# Patient Record
Sex: MALE | Race: WHITE | Employment: FULL TIME | URBAN - METROPOLITAN AREA
[De-identification: names, ages, dates, MRNs, and addresses within clinical notes are randomized per-mention and may not be internally consistent; named-entity substitution may affect disease eponyms.]

---

## 2019-08-13 ENCOUNTER — THERAPY VISIT (OUTPATIENT)
Dept: PHYSICAL THERAPY | Facility: CLINIC | Age: 29
End: 2019-08-13
Payer: COMMERCIAL

## 2019-08-13 DIAGNOSIS — M54.42 LEFT-SIDED LOW BACK PAIN WITH LEFT-SIDED SCIATICA: ICD-10-CM

## 2019-08-13 PROCEDURE — 97110 THERAPEUTIC EXERCISES: CPT | Mod: GP | Performed by: PHYSICAL THERAPIST

## 2019-08-13 PROCEDURE — 97112 NEUROMUSCULAR REEDUCATION: CPT | Mod: GP | Performed by: PHYSICAL THERAPIST

## 2019-08-13 PROCEDURE — 97161 PT EVAL LOW COMPLEX 20 MIN: CPT | Mod: GP | Performed by: PHYSICAL THERAPIST

## 2019-08-13 NOTE — PROGRESS NOTES
[facility] Initial Evaluation  Subjective:  The history is provided by the patient. No  was used.   Chu Elizabeth being seen for LBP.   Problem began 7/29/2019. Where condition occurred: for unknown reasons.Problem occurred: insidious  and reported as 6/10 on pain scale. General health as reported by patient is good. Pertinent medical history includes:  Smoking.    Surgeries include:  None.  Current medications:  Muscle relaxants and other. Other medications details: Gabapentin.   Primary job tasks include:  Prolonged standing, repetitive tasks, lifting/carrying, pushing/pulling and other (lot of bending).  Pain is described as sharp, stabbing and aching and is constant. Pain is worse in the A.M.. Since onset symptoms are gradually worsening. Special tests:  Other (ultrasound negative for hernia).     Patient is . Restrictions include:  Working in normal job without restrictions.    Barriers include:  None as reported by patient.  Red flags:  None as reported by patient.  Type of problem:  Lumbar   Condition occurred with:  Insidious onset. This is a chronic condition   Problem details: Patient reports that he has had lower back pain on and off over the last year and worse over the last month. He has been taking Gabapetnin and that has helped. MD order from 7-29-19..   Patient reports pain:  Lumbar spine left. Radiates to:  Gluteals left (L testicle). Associated symptoms:  Loss of motion/stiffness. Symptoms are exacerbated by standing, lifting, sitting and other (stand star 1 hour with 6-7/10 pain, sit star 1 hour with 4-5/10 pain, takes smaller stride when walking on the L due to pain, stand to sit with 7-8/10 pain ) and relieved by other and nothing (Gabapentin).    Oswestry Score: 26 %                 Objective:  System         Lumbar/SI Evaluation  ROM:    AROM Lumbar:   Flexion:          Fingertips to knees and increase  Ext:                    Mod loss and no  change    Side Bend:        Left:     Right:   Rotation:           Left:     Right:   Side Glide:        Left:  Min loss and increase L testicular pain    Right:  Min loss and increase in BNP          Lumbar Myotomes:  normal            Lumbar DTR's:  normal      Cord Signs:  normal    Lumbar Dermtomes:  normal                Neural Tension/Mobility:    Left side:  SLR and Slump positive.   Right side:   Slump and SLR positive.  Lumbar Palpation:    Tenderness present at Left:    Quadratus Lumborum and Erector Spinae  Tenderness present at Right: Erector Spinae      Spinal Segmental Conclusions:     Level: Hypo noted at L4, L3, L2, L1 and L5                                                     [facility] Lumbar Evaluation    Posture:  Sitting: poor  Standing: fair  Lordosis: Reduced  Lateral Shift: no  Correction of Posture: better      Test Movements:  FIS: During: increases  After: no worse    Repeat FIS: During: increases  After: worse    EIS: During: no effect  After: no effect    Repeat EIS: During: decreases  After: no effect    TRAV: During: no effect  After: no effect      EIL: During: no effect  After: no effect    Repeat EIL: During: decreases  After: centralizing and better  Mechanical Response: IncROM        Conclusion: derangement                                         ROS    Assessment/Plan:    Patient is a 28 year old male with lumbar complaints.    Patient has the following significant findings with corresponding treatment plan.                Diagnosis 1:  Lumbar derangement  Pain -  manual therapy, self management, education, directional preference exercise and home program  Decreased ROM/flexibility - manual therapy, therapeutic exercise and home program  Decreased joint mobility - manual therapy, therapeutic exercise and home program  Impaired muscle performance - neuro re-education and home program  Decreased function - therapeutic activities and home program  Impaired posture - neuro re-education  and home program    Therapy Evaluation Codes:   1) History comprised of:   Personal factors that impact the plan of care:      None.    Comorbidity factors that impact the plan of care are:      None.     Medications impacting care: None.  2) Examination of Body Systems comprised of:   Body structures and functions that impact the plan of care:      Lumbar spine.   Activity limitations that impact the plan of care are:      Bathing, Bending, Driving, Lifting, Sitting, Standing and transitional movements.  3) Clinical presentation characteristics are:   Stable/Uncomplicated.  4) Decision-Making    Low complexity using standardized patient assessment instrument and/or measureable assessment of functional outcome.  Cumulative Therapy Evaluation is: Low complexity.    Previous and current functional limitations:  (See Goal Flow Sheet for this information)    Short term and Long term goals: (See Goal Flow Sheet for this information)     Communication ability:  Patient appears to be able to clearly communicate and understand verbal and written communication and follow directions correctly.  Treatment Explanation - The following has been discussed with the patient:   RX ordered/plan of care  Anticipated outcomes  Possible risks and side effects  This patient would benefit from PT intervention to resume normal activities.   Rehab potential is good.    Frequency:  1 X week, once daily  Duration:  for 6 weeks  Discharge Plan:  Achieve all LTG.  Independent in home treatment program.  Reach maximal therapeutic benefit.    Please refer to the daily flowsheet for treatment today, total treatment time and time spent performing 1:1 timed codes.

## 2019-08-14 PROBLEM — M54.42 LEFT-SIDED LOW BACK PAIN WITH LEFT-SIDED SCIATICA: Status: ACTIVE | Noted: 2019-08-14

## 2019-08-20 ENCOUNTER — THERAPY VISIT (OUTPATIENT)
Dept: PHYSICAL THERAPY | Facility: CLINIC | Age: 29
End: 2019-08-20
Payer: COMMERCIAL

## 2019-08-20 DIAGNOSIS — M54.42 LEFT-SIDED LOW BACK PAIN WITH LEFT-SIDED SCIATICA: ICD-10-CM

## 2019-08-20 PROCEDURE — 97110 THERAPEUTIC EXERCISES: CPT | Mod: GP | Performed by: PHYSICAL THERAPIST

## 2019-08-20 PROCEDURE — 97140 MANUAL THERAPY 1/> REGIONS: CPT | Mod: GP | Performed by: PHYSICAL THERAPIST

## 2019-09-24 ENCOUNTER — THERAPY VISIT (OUTPATIENT)
Dept: PHYSICAL THERAPY | Facility: CLINIC | Age: 29
End: 2019-09-24
Payer: COMMERCIAL

## 2019-09-24 DIAGNOSIS — M54.42 LEFT-SIDED LOW BACK PAIN WITH LEFT-SIDED SCIATICA: ICD-10-CM

## 2019-09-24 PROCEDURE — 97110 THERAPEUTIC EXERCISES: CPT | Mod: GP | Performed by: PHYSICAL THERAPIST

## 2019-09-24 PROCEDURE — 97140 MANUAL THERAPY 1/> REGIONS: CPT | Mod: GP | Performed by: PHYSICAL THERAPIST

## 2019-10-15 ENCOUNTER — THERAPY VISIT (OUTPATIENT)
Dept: PHYSICAL THERAPY | Facility: CLINIC | Age: 29
End: 2019-10-15
Payer: COMMERCIAL

## 2019-10-15 DIAGNOSIS — M54.42 LEFT-SIDED LOW BACK PAIN WITH LEFT-SIDED SCIATICA: ICD-10-CM

## 2019-10-15 PROCEDURE — 97140 MANUAL THERAPY 1/> REGIONS: CPT | Mod: GP | Performed by: PHYSICAL THERAPIST

## 2019-10-15 PROCEDURE — 97110 THERAPEUTIC EXERCISES: CPT | Mod: GP | Performed by: PHYSICAL THERAPIST

## 2019-10-15 NOTE — PROGRESS NOTES
SUBJECTIVE  Subjective changes as noted by pt:  Patient reports that he has improved by 60-65% since starting therapy. He can walk with a normal stride now. He still gets pain when bending, but is trying to do it less as able. He flared his pain last weekend while duck hunting. He had no left testicular pain on Friday or Sunday and that is unusual, but it is sore again today. He still has pain when moving his hips to the R. He is trying to do as little bending as he can, but that is hard on his job. He has not noted a lot of change over the last couple of weeks. He does the press ups 2-3x/day.       Current Pain level: 4/10   Changes in function:  Yes (See Goal flowsheet attached for changes in current functional level)     Adverse reaction to treatment or activity:  None    OBJECTIVE  Changes in objective findings:  Standing LROM: FB with fingertips to floor and ache in L testicle about half way into the FB and increase L lower back pain with reps, and BB mod loss and better. Right side glides WNL and no pain and left side glides mod loss and ache into L testicle. No change with repeated extension with shift, but centralized and better with REIL and francisco when hands on pillows with REIL to get to end range. Following REIL and manual lumbar mobs, patient had full left side glide without any pain.      ASSESSMENT  Chu continues to require intervention to meet STG and LTG's: PT  Patient's symptoms are resolving.  Response to therapy has shown an improvement in  posture and function  Progress made towards STG/LTG?  Yes (See Goal flowsheet attached for updates on achievement of STG and LTG)    PLAN  Current treatment program is being advanced to more complex exercises.    PTA/ATC plan:  N/A    Please refer to the daily flowsheet for treatment today, total treatment time and time spent performing 1:1 timed codes.

## 2019-12-12 PROBLEM — M54.42 LEFT-SIDED LOW BACK PAIN WITH LEFT-SIDED SCIATICA: Status: RESOLVED | Noted: 2019-08-14 | Resolved: 2019-12-12

## 2019-12-12 NOTE — PROGRESS NOTES
Discharge Note    Progress reporting period is from initial evaluation date (please see noted date below) to Oct 15, 2019.  No linked episodes      Chu failed to follow up and current status is unknown.  Please see information below for last relevant information on current status.  Patient seen for 4 visits.    SUBJECTIVE  Subjective changes noted by patient:     .  Current pain level is 4/10.     Previous pain level was  4/10.   Changes in function:  Yes (See Goal flowsheet attached for changes in current functional level)  Adverse reaction to treatment or activity: None    OBJECTIVE  Changes noted in objective findings:       ASSESSMENT/PLAN  Diagnosis: lumbar derangement   Updated problem list and treatment plan:   Pain - HEP  Decreased ROM/flexibility - HEP  Decreased function - HEP  Impaired muscle performance - HEP  Impaired posture - HEP  Decreased joint mobility - HEP  STG/LTGs have been met or progress has been made towards goals:  Yes, please see goal flowsheet for most current information  Assessment of Progress: current status is unknown.    Last current status:     Self Management Plans:  HEP  I have re-evaluated this patient and find that the nature, scope, duration and intensity of the therapy is appropriate for the medical condition of the patient.  Chu continues to require the following intervention to meet STG and LTG's:  HEP.    Recommendations:  Discharge with current home program.  Patient to follow up with MD as needed.    Please refer to the daily flowsheet for treatment today, total treatment time and time spent performing 1:1 timed codes.

## 2025-01-31 ENCOUNTER — MEDICAL CORRESPONDENCE (OUTPATIENT)
Dept: HEALTH INFORMATION MANAGEMENT | Facility: CLINIC | Age: 35
End: 2025-01-31
Payer: COMMERCIAL

## 2025-02-04 ENCOUNTER — TRANSCRIBE ORDERS (OUTPATIENT)
Dept: OTHER | Age: 35
End: 2025-02-04

## 2025-02-04 DIAGNOSIS — K92.1 BLOOD IN STOOL: ICD-10-CM

## 2025-02-04 DIAGNOSIS — R10.32 LLQ ABDOMINAL PAIN: Primary | ICD-10-CM
